# Patient Record
Sex: FEMALE | Race: BLACK OR AFRICAN AMERICAN | Employment: FULL TIME
[De-identification: names, ages, dates, MRNs, and addresses within clinical notes are randomized per-mention and may not be internally consistent; named-entity substitution may affect disease eponyms.]

---

## 2022-10-27 ENCOUNTER — NURSE TRIAGE (OUTPATIENT)
Dept: OTHER | Facility: CLINIC | Age: 37
End: 2022-10-27

## 2022-10-27 NOTE — TELEPHONE ENCOUNTER
[facility]    Received call from [facility] with Red Flag Complaint. Subjective: Caller states \"URI symptoms. Wheezing when breathing. Wheezing at night. \"     Current Symptoms: Runny nose, cough, wheezing at night, sinus pressure, headache. Onset: 4 days ago; sudden    Associated Symptoms: NA    Pain Severity: 5/10; pressure; constant    Temperature: patient denies by unknown method    What has been tried: Sophie Adair     LMP:  10/14/2022  Pregnant: No    Recommended disposition: See in Office Today or Tomorrow -if unable to get an office appointment b/c of un established patient, please go to [facility] for care. Care advice provided, patient verbalizes understanding; denies any other questions or concerns; instructed to call back for any new or worsening symptoms. Patient/Caller agrees with recommended disposition; writer provided warm transfer to Sebas Christian at [facility] for appointment scheduling    Attention Provider: Thank you for allowing me to participate in the care of your patient. The patient was connected to triage in response to information provided to the [facility]. Please do not respond through this encounter as the response is not directed to a shared pool.         Reason for Disposition   Patient wants to be seen    Protocols used: Sinus Pain or Congestion-ADULT-[place]

## 2022-10-28 ENCOUNTER — VIRTUAL VISIT (OUTPATIENT)
Dept: FAMILY MEDICINE CLINIC | Age: 37
End: 2022-10-28
Payer: COMMERCIAL

## 2022-10-28 DIAGNOSIS — J06.9 UPPER RESPIRATORY TRACT INFECTION, UNSPECIFIED TYPE: Primary | ICD-10-CM

## 2022-10-28 PROBLEM — E78.2 MIXED HYPERLIPIDEMIA: Status: ACTIVE | Noted: 2022-10-28

## 2022-10-28 PROBLEM — I10 PRIMARY HYPERTENSION: Status: ACTIVE | Noted: 2022-10-28

## 2022-10-28 PROBLEM — J30.2 SEASONAL ALLERGIC RHINITIS: Status: ACTIVE | Noted: 2022-10-28

## 2022-10-28 PROCEDURE — 99203 OFFICE O/P NEW LOW 30 MIN: CPT | Performed by: STUDENT IN AN ORGANIZED HEALTH CARE EDUCATION/TRAINING PROGRAM

## 2022-10-28 RX ORDER — LOSARTAN POTASSIUM 25 MG/1
25 TABLET ORAL DAILY
COMMUNITY
Start: 2022-08-26

## 2022-10-28 RX ORDER — NORETHINDRONE 5 MG/1
TABLET ORAL
COMMUNITY
Start: 2022-09-01

## 2022-10-28 RX ORDER — OXYMETAZOLINE HCL 0.05 %
2 SPRAY, NON-AEROSOL (ML) NASAL 2 TIMES DAILY
Qty: 1 EACH | Refills: 0 | Status: SHIPPED | OUTPATIENT
Start: 2022-10-28 | End: 2022-10-31

## 2022-10-28 NOTE — PROGRESS NOTES
ZMBBSGY D Ken  40 y.o. female  1985  [address]  [id number]   [address]:    Telemedicine Progress Note  Maxime Mckeon DO       Encounter Date and Time: October 28, 2022 at 8:47 AM    Consent: Sophie Morton, who was seen by synchronous (real-time) audio-video technology, and/or her healthcare decision maker, is aware that this patient-initiated, Telehealth encounter on 10/28/2022 is a billable service, with coverage as determined by her insurance carrier. She is aware that she may receive a bill and has provided verbal consent to proceed: Yes. Chief Complaint   Patient presents with    Nasal Congestion       History of Present Illness   Ban Rogers was evaluated by synchronous (real-time) audio-video technology from [facility], through a secure patient portal.    Sophie Morton is a 40 y.o. female presents today with a chief complaint of congestion and wheezing at night. Symptoms started 4 days ago. Congestion worse at night. Occasionally with dry cough and wheezing, denies SOB or chest, no history of asthma. No fever. Mucus yellow. No sick contacts. Has not gotten flu or covid vaccines. Has tried OTC sinus medicine, allegra and flonase without improvement. Review of Systems   Review of Systems   Constitutional:  Negative for chills and fever. HENT:  Positive for congestion. Negative for ear pain, sinus pain and sore throat. Respiratory:  Positive for cough (occasional) and wheezing (occasional). Negative for sputum production and shortness of breath. Cardiovascular:  Negative for chest pain. Neurological:  Negative for headaches.      Vitals/Objective:     General: alert, cooperative, no distress   Mental  status: mental status: alert, oriented to person, place, and time, normal mood, behavior, speech, dress, motor activity, and thought processes   Resp: resp: normal effort, no respiratory distress, and Congested sounding   Neuro: neuro: no gross deficits   Skin: skin: no discoloration or lesions of concern on visible areas   Due to this being a TeleHealth evaluation, many elements of the physical examination are unable to be assessed. Assessment and Plan:   Time-based coding, delete if not needed: I spent at least 10 minutes with this established patient, and >50% of the time was spent counseling and/or coordinating care regarding URI symptoms    Diagnoses and all orders for this visit:    1. Upper respiratory tract infection, unspecified type: will try short course of Afrin in addition to flonase to help with severe congestion. Emphasized the importance of only a 3 day course as extended duration can lead to rebound congestion and elevated BP.  -     oxymetazoline (Afrin, oxymetazoline,) 0.05 % nasal spray; 2 Sprays by Both Nostrils route two (2) times a day for 3 days. - Continue flonase, use after afrin to better facilitate delivery  - Add mucinex  - Can continue allegra  - Follow up in 5 days, if no improvement consider Abx for sinus infection         We discussed the expected course, resolution and complications of the diagnosis(es) in detail. Medication risks, benefits, costs, interactions, and alternatives were discussed as indicated. I advised her to contact the office if her condition worsens, changes or fails to improve as anticipated. She expressed understanding with the diagnosis(es) and plan. Patient understands that this encounter was a temporary measure, and the importance of further follow up and examination was emphasized. Patient verbalized understanding. Electronically Signed: Santaigo Avelar DO    CPT Codes 51438-81868 for Established Patients may apply to this Telehealth Visit. POS code: 18. Modifier GT    Surjit Higgins is a 40 y.o. female who was evaluated by an audio-video encounter for concerns as above. Patient identification was verified prior to start of the visit. A caregiver was present when appropriate.  Due to this being a TeleHealth encounter (During [facility]-28 public health emergency), evaluation of the following organ systems was limited: Vitals/Constitutional/EENT/Resp/CV/GI//MS/Neuro/Skin/Heme-Lymph-Imm. Pursuant to the emergency declaration under the [facility], [facility] waiver authority and the [employer] Act, this Virtual Visit was conducted, with patient's (and/or legal guardian's) consent, to reduce the patient's risk of exposure to COVID-19 and provide necessary medical care. Services were provided through a synchronous discussion virtually to substitute for in-person clinic visit. I was in the office. The patient was at home. History   Patients past medical, surgical and family histories were reviewed and updated. No past medical history on file. No past surgical history on file. No family history on file. Social History     Tobacco Use    Smoking status: Never   Substance Use Topics    Alcohol use: No     There is no problem list on file for this patient. Current Medications/Allergies   Medications and Allergies reviewed:    Current Outpatient Medications   Medication Sig Dispense Refill    oxymetazoline (Afrin, oxymetazoline,) 0.05 % nasal spray 2 Sprays by Both Nostrils route two (2) times a day for 3 days. 1 Each 0    norethindrone acetate (AYGESTIN) 5 mg tablet TAKE ONE TABLET BY MOUTH TWICE DAILY FOR 15 DAYS      losartan (COZAAR) 25 mg tablet Take 25 mg by mouth daily. ibuprofen (MOTRIN) 200 mg tablet Take 400 mg by mouth every six (6) hours as needed for Pain.        No Known Allergies

## 2022-10-28 NOTE — PROGRESS NOTES
[address] Medicine Residency Attending Addendum:  Dr. Shalonda Snow, DO,  the patient and I were not physically present during this encounter. The resident and I are concurrently monitoring the patient care through appropriate telecommunication technology. I discussed the findings, assessment and plan with the resident and agree with the resident's findings and plan as documented in the resident's note. We discussed the concerns over Afrin use.     Macie Linares MD